# Patient Record
Sex: MALE | Race: WHITE | NOT HISPANIC OR LATINO
[De-identification: names, ages, dates, MRNs, and addresses within clinical notes are randomized per-mention and may not be internally consistent; named-entity substitution may affect disease eponyms.]

---

## 2021-09-22 PROBLEM — Z00.00 ENCOUNTER FOR PREVENTIVE HEALTH EXAMINATION: Status: ACTIVE | Noted: 2021-09-22

## 2021-09-23 ENCOUNTER — APPOINTMENT (OUTPATIENT)
Dept: PULMONOLOGY | Facility: CLINIC | Age: 86
End: 2021-09-23
Payer: MEDICARE

## 2021-09-23 VITALS
BODY MASS INDEX: 31.4 KG/M2 | HEIGHT: 69 IN | TEMPERATURE: 95.1 F | OXYGEN SATURATION: 97 % | DIASTOLIC BLOOD PRESSURE: 70 MMHG | HEART RATE: 89 BPM | SYSTOLIC BLOOD PRESSURE: 142 MMHG | WEIGHT: 212 LBS

## 2021-09-23 DIAGNOSIS — I48.91 UNSPECIFIED ATRIAL FIBRILLATION: ICD-10-CM

## 2021-09-23 DIAGNOSIS — Z82.49 FAMILY HISTORY OF ISCHEMIC HEART DISEASE AND OTHER DISEASES OF THE CIRCULATORY SYSTEM: ICD-10-CM

## 2021-09-23 DIAGNOSIS — Z80.9 FAMILY HISTORY OF MALIGNANT NEOPLASM, UNSPECIFIED: ICD-10-CM

## 2021-09-23 DIAGNOSIS — Z78.9 OTHER SPECIFIED HEALTH STATUS: ICD-10-CM

## 2021-09-23 DIAGNOSIS — Z87.891 PERSONAL HISTORY OF NICOTINE DEPENDENCE: ICD-10-CM

## 2021-09-23 DIAGNOSIS — N18.4 CHRONIC KIDNEY DISEASE, STAGE 4 (SEVERE): ICD-10-CM

## 2021-09-23 DIAGNOSIS — I10 ESSENTIAL (PRIMARY) HYPERTENSION: ICD-10-CM

## 2021-09-23 LAB — EPWORTH SCORE: 9

## 2021-09-23 PROCEDURE — 90662 IIV NO PRSV INCREASED AG IM: CPT

## 2021-09-23 PROCEDURE — 99205 OFFICE O/P NEW HI 60 MIN: CPT | Mod: 25

## 2021-09-23 PROCEDURE — G0008: CPT

## 2021-09-23 RX ORDER — SACUBITRIL AND VALSARTAN 24; 26 MG/1; MG/1
24-26 TABLET, FILM COATED ORAL
Refills: 0 | Status: ACTIVE | COMMUNITY

## 2021-09-23 RX ORDER — CALCITRIOL 0.25 UG/1
0.25 CAPSULE, LIQUID FILLED ORAL
Qty: 60 | Refills: 0 | Status: ACTIVE | COMMUNITY
Start: 2021-08-18

## 2021-09-23 RX ORDER — APIXABAN 2.5 MG/1
2.5 TABLET, FILM COATED ORAL
Qty: 60 | Refills: 0 | Status: ACTIVE | COMMUNITY
Start: 2021-08-11

## 2021-09-23 RX ORDER — AMLODIPINE BESYLATE 2.5 MG/1
2.5 TABLET ORAL
Qty: 30 | Refills: 0 | Status: ACTIVE | COMMUNITY
Start: 2021-08-31

## 2021-09-23 RX ORDER — PREDNISONE 20 MG/1
20 TABLET ORAL
Qty: 8 | Refills: 0 | Status: ACTIVE | COMMUNITY
Start: 2021-09-07

## 2021-09-23 RX ORDER — FUROSEMIDE 40 MG/1
40 TABLET ORAL
Qty: 60 | Refills: 0 | Status: ACTIVE | COMMUNITY
Start: 2021-09-07

## 2021-09-23 NOTE — PHYSICAL EXAM
[No Acute Distress] : no acute distress [Normal Appearance] : normal appearance [No Neck Mass] : no neck mass [Normal Rate/Rhythm] : normal rate/rhythm [Normal S1, S2] : normal s1, s2 [No Murmurs] : no murmurs [No Resp Distress] : no resp distress [Benign] : benign [Normal Gait] : normal gait [No Clubbing] : no clubbing [No Cyanosis] : no cyanosis [FROM] : FROM [Normal Color/ Pigmentation] : normal color/ pigmentation [No Focal Deficits] : no focal deficits [Oriented x3] : oriented x3 [Normal Affect] : normal affect [Enlarged Base of the Tongue] : enlarged base of the tongue [Erythema] : erythema [Nasal congestion] : nasal congestion [Murmur ___ / 6] : murmur [unfilled] / 6 [No Abnormalities] : no abnormalities [Kyphosis] : kyphosis [Scoliosis] : scoliosis [IV] : Mallampati Class: IV [TextBox_11] : severe deviated nasal septum, crowded o/p  [TextBox_68] : globally diminished BS, decreased BS at the bases, scant basilar rales, mild expiratory wheeze  [TextBox_89] : obese [TextBox_105] : 1+ brawny edema w/ CVS changes

## 2021-09-23 NOTE — CONSULT LETTER
[FreeTextEntry1] : Thank you for allowing me to consult on AUGUSTIN MAGALLANES  for  COPD/ TER[facility].  Please see my note below.\par \par   [FreeTextEntry3] : Thank you very much for allowing me to consult on your patient.  If you have any questions, please do not hesitate to contact me.\par \par Sincerely,\par \par Elliott Yang MD\par Pulmonary and Sleep Medicine\par [facility] [___] : [unfilled]

## 2021-09-23 NOTE — ASSESSMENT
[FreeTextEntry1] : Above was discussed at length with the patient and his wife who have an excellent understanding of the issues.

## 2021-09-23 NOTE — REVIEW OF SYSTEMS
[Fatigue] : fatigue [EDS] : eds [Nasal Congestion] : nasal congestion [Dyspnea] : dyspnea [A.M. Dry Mouth] : a.m. dry mouth [SOB on Exertion] : sob on exertion [Leg Cramps] : leg cramps [Seasonal Allergies] : seasonal allergies [Arthralgias] : arthralgias [Negative] : Endocrine

## 2021-09-23 NOTE — HISTORY OF PRESENT ILLNESS
[Former] : former [Never] : never [TextBox_4] : I was asked to consult on this 89 y/o WM for emphysema. \par The patient is a former smoker (age 14- 39) former  w/ a PMHx sig for HTN, s/p pacemaker who presents w/ his wife for SOB. He states he can only walk 1/2 block before he gets SOB. He coughs up yellow phlegm once in a while more often in the morning. He suffers from severe allergy symptoms and nasal congestion when pollen is high but this only started about 20 years ago. He had ankle swelling from amlodipine but he is on furosemide to counteract that. He wakes up at least 2x/ night, sometimes 3x. He often uses the bathroom when he wakes up. He sleeps with his mouth open at night and wakes up with a dry mouth. His wife says his snoring used to be worse, but it has subsided. He usually wakes up around 6 AM. He denies CP, pressure, tightness, cough while eating, or heartburn. He has fluctuated between 205- 220 lbs the past 5 years. He didn't notice any difference in his breathing when he was 205 compared to 220 but his wife says he had more energy when his weight was lower. 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  [TextBox_13] : 25 [YearQuit] : 1971

## 2021-09-23 NOTE — DISCUSSION/SUMMARY
[FreeTextEntry1] : [facility] 9/15/2021: WBC 8.5, Hg 10, Hct 30.5, , eos 3%, Bun 77, Cr 3.42, bicarb 21, LFTs normal \par \par CXR 9/6/21 done at [facility]: cardiomegaly, permanent pacemaker, cardiovascular congestion, + hyperinflation

## 2021-09-24 ENCOUNTER — LABORATORY RESULT (OUTPATIENT)
Age: 86
End: 2021-09-24

## 2021-09-24 ENCOUNTER — RESULT REVIEW (OUTPATIENT)
Age: 86
End: 2021-09-24

## 2021-09-26 LAB
25(OH)D3 SERPL-MCNC: 51.2 NG/ML
BASOPHILS # BLD AUTO: 0.03 K/UL
BASOPHILS NFR BLD AUTO: 0.4 %
EOSINOPHIL # BLD AUTO: 0.19 K/UL
EOSINOPHIL NFR BLD AUTO: 2.3 %
ERYTHROCYTE [SEDIMENTATION RATE] IN BLOOD BY WESTERGREN METHOD: 37 MM/HR
HCT VFR BLD CALC: 33.4 %
HGB BLD-MCNC: 10.2 G/DL
IMM GRANULOCYTES NFR BLD AUTO: 0.4 %
LYMPHOCYTES # BLD AUTO: 0.92 K/UL
LYMPHOCYTES NFR BLD AUTO: 11 %
MAGNESIUM SERPL-MCNC: 2.2 MG/DL
MAN DIFF?: NORMAL
MCHC RBC-ENTMCNC: 30.5 GM/DL
MCHC RBC-ENTMCNC: 30.8 PG
MCV RBC AUTO: 100.9 FL
MONOCYTES # BLD AUTO: 1.08 K/UL
MONOCYTES NFR BLD AUTO: 12.9 %
NEUTROPHILS # BLD AUTO: 6.15 K/UL
NEUTROPHILS NFR BLD AUTO: 73 %
NT-PROBNP SERPL-MCNC: 1753 PG/ML
PLATELET # BLD AUTO: 314 K/UL
RBC # BLD: 3.31 M/UL
RBC # FLD: 12.6 %
WBC # FLD AUTO: 8.4 K/UL

## 2021-09-28 ENCOUNTER — RESULT REVIEW (OUTPATIENT)
Age: 86
End: 2021-09-28

## 2021-10-04 LAB
A ALTERNATA IGE QN: <0.1 KUA/L
A FUMIGATUS IGE QN: <0.1 KUA/L
BERMUDA GRASS IGE QN: <0.1 KUA/L
BOXELDER IGE QN: <0.1 KUA/L
C HERBARUM IGE QN: <0.1 KUA/L
CALIF WALNUT IGE QN: <0.1 KUA/L
CAT DANDER IGE QN: 0.22 KUA/L
CMN PIGWEED IGE QN: <0.1 KUA/L
COMMON RAGWEED IGE QN: <0.1 KUA/L
COTTONWOOD IGE QN: <0.1 KUA/L
D FARINAE IGE QN: 1.85 KUA/L
D PTERONYSS IGE QN: 1.02 KUA/L
DEPRECATED A ALTERNATA IGE RAST QL: 0
DEPRECATED A FUMIGATUS IGE RAST QL: 0
DEPRECATED BERMUDA GRASS IGE RAST QL: 0
DEPRECATED BOXELDER IGE RAST QL: 0
DEPRECATED C HERBARUM IGE RAST QL: 0
DEPRECATED CAT DANDER IGE RAST QL: NORMAL
DEPRECATED COMMON PIGWEED IGE RAST QL: 0
DEPRECATED COMMON RAGWEED IGE RAST QL: 0
DEPRECATED COTTONWOOD IGE RAST QL: 0
DEPRECATED D FARINAE IGE RAST QL: 2
DEPRECATED D PTERONYSS IGE RAST QL: 2
DEPRECATED DOG DANDER IGE RAST QL: 0
DEPRECATED GOOSEFOOT IGE RAST QL: 0
DEPRECATED LONDON PLANE IGE RAST QL: 0
DEPRECATED MOUSE URINE PROT IGE RAST QL: 0
DEPRECATED MUGWORT IGE RAST QL: 0
DEPRECATED P NOTATUM IGE RAST QL: 0
DEPRECATED RED CEDAR IGE RAST QL: 0
DEPRECATED ROACH IGE RAST QL: 0
DEPRECATED SHEEP SORREL IGE RAST QL: 0
DEPRECATED SILVER BIRCH IGE RAST QL: 0
DEPRECATED TIMOTHY IGE RAST QL: 0
DEPRECATED WHITE ASH IGE RAST QL: 0
DEPRECATED WHITE OAK IGE RAST QL: 0
DOG DANDER IGE QN: <0.1 KUA/L
GOOSEFOOT IGE QN: <0.1 KUA/L
LONDON PLANE IGE QN: <0.1 KUA/L
MOUSE URINE PROT IGE QN: <0.1 KUA/L
MUGWORT IGE QN: <0.1 KUA/L
MULBERRY (T70) CLASS: 0
MULBERRY (T70) CONC: <0.1 KUA/L
P NOTATUM IGE QN: <0.1 KUA/L
RED CEDAR IGE QN: <0.1 KUA/L
ROACH IGE QN: <0.1 KUA/L
SHEEP SORREL IGE QN: <0.1 KUA/L
SILVER BIRCH IGE QN: <0.1 KUA/L
TIMOTHY IGE QN: <0.1 KUA/L
TOTAL IGE SMQN RAST: 200 KU/L
TREE ALLERG MIX1 IGE QL: 0
WHITE ASH IGE QN: <0.1 KUA/L
WHITE ELM IGE QN: 0
WHITE ELM IGE QN: <0.1 KUA/L
WHITE OAK IGE QN: <0.1 KUA/L

## 2021-10-12 RX ORDER — TIOTROPIUM BROMIDE INHALATION SPRAY 3.12 UG/1
2.5 SPRAY, METERED RESPIRATORY (INHALATION)
Qty: 1 | Refills: 5 | Status: ACTIVE | COMMUNITY
Start: 2021-09-23 | End: 1900-01-01

## 2021-10-14 ENCOUNTER — APPOINTMENT (OUTPATIENT)
Dept: PULMONOLOGY | Facility: CLINIC | Age: 86
End: 2021-10-14
Payer: MEDICARE

## 2021-10-14 ENCOUNTER — NON-APPOINTMENT (OUTPATIENT)
Age: 86
End: 2021-10-14

## 2021-10-14 VITALS
OXYGEN SATURATION: 99 % | HEIGHT: 69 IN | HEART RATE: 91 BPM | TEMPERATURE: 94.7 F | SYSTOLIC BLOOD PRESSURE: 126 MMHG | BODY MASS INDEX: 31.84 KG/M2 | DIASTOLIC BLOOD PRESSURE: 64 MMHG | WEIGHT: 215 LBS

## 2021-10-14 DIAGNOSIS — R06.83 SNORING: ICD-10-CM

## 2021-10-14 PROCEDURE — 99215 OFFICE O/P EST HI 40 MIN: CPT

## 2021-10-14 NOTE — DISCUSSION/SUMMARY
[FreeTextEntry1] : All images and report reviewed by me in the office \par CXR 9/21/2021: Showed hyperinflation, cardiomegaly, mild eventration of the L hemidiaphragm. \par CXR 9/6/21 done at [facility]: cardiomegaly, permanent pacemaker, cardiovascular congestion, + hyperinflation\par \par PFTs 10/01/2021 Actual FEV1 1.38 (FEV1 Pred 51%), FEV1/FVC(%) - 66, %, TLC 79%, RV/%, DLCO 44% \par 6MWT 10/1/2021: No sig desat post 6 min on RA.\par \par [facility] 9/15/2021: WBC 8.5, Hg 10, Hct 30.5, , eos 3%, Bun 77, Cr 3.42, bicarb 21, LFTs normal \par

## 2021-10-14 NOTE — REVIEW OF SYSTEMS
[Postnasal Drip] : postnasal drip [Dry Mouth] : dry mouth [Sinus Problems] : sinus problems [Cough] : cough [Sputum] : sputum [A.M. Dry Mouth] : a.m. dry mouth [SOB on Exertion] : sob on exertion [Edema] : edema [Leg Cramps] : leg cramps [Seasonal Allergies] : seasonal allergies [Negative] : Endocrine [TextBox_30] : yellow

## 2021-10-14 NOTE — HISTORY OF PRESENT ILLNESS
[Former] : former [Never] : never [TextBox_4] : Patient returns w/ his wife on a f/u for emphysema. He has been using Stiolto QAM, but doesn't really see a difference. His wife notes he was doing "great" the first couple of days on Stiolto but he has declined over the last week or so. He has been doing physical therapy and is trying to breathe through his nose more. He only coughs occasionally and denies CP or pressure. He rests for 3 hours every night before he actually goes to sleep. His wife says he's been feeling very cold lately. [TextBox_13] : 25 [YearQuit] : 1971

## 2021-10-14 NOTE — PHYSICAL EXAM
[No Acute Distress] : no acute distress [Enlarged Base of the Tongue] : enlarged base of the tongue [Nasal congestion] : nasal congestion [IV] : Mallampati Class: IV [Normal Appearance] : normal appearance [No Neck Mass] : no neck mass [Normal Rate/Rhythm] : normal rate/rhythm [Murmur ___ / 6] : murmur [unfilled] / 6 [Normal S1, S2] : normal s1, s2 [No Murmurs] : no murmurs [No Resp Distress] : no resp distress [No Abnormalities] : no abnormalities [Kyphosis] : kyphosis [Scoliosis] : scoliosis [Benign] : benign [Normal Gait] : normal gait [No Clubbing] : no clubbing [No Cyanosis] : no cyanosis [FROM] : FROM [Normal Color/ Pigmentation] : normal color/ pigmentation [No Focal Deficits] : no focal deficits [Oriented x3] : oriented x3 [Normal Affect] : normal affect [TextBox_11] : severe deviated nasal septum [TextBox_68] : full lung, bibasilar rales [TextBox_89] : obese [TextBox_105] : 1+ edema

## 2021-10-26 ENCOUNTER — RESULT REVIEW (OUTPATIENT)
Age: 86
End: 2021-10-26

## 2021-10-26 ENCOUNTER — APPOINTMENT (OUTPATIENT)
Dept: PULMONOLOGY | Facility: CLINIC | Age: 86
End: 2021-10-26
Payer: MEDICARE

## 2021-10-26 VITALS
SYSTOLIC BLOOD PRESSURE: 102 MMHG | HEIGHT: 69 IN | BODY MASS INDEX: 31.84 KG/M2 | TEMPERATURE: 98.6 F | OXYGEN SATURATION: 97 % | DIASTOLIC BLOOD PRESSURE: 60 MMHG | HEART RATE: 75 BPM | WEIGHT: 215 LBS

## 2021-10-26 PROCEDURE — 99215 OFFICE O/P EST HI 40 MIN: CPT

## 2021-10-27 LAB
ANION GAP SERPL CALC-SCNC: 17 MMOL/L
BASOPHILS # BLD AUTO: 0.04 K/UL
BASOPHILS NFR BLD AUTO: 0.5 %
BUN SERPL-MCNC: 74 MG/DL
CALCIUM SERPL-MCNC: 9.9 MG/DL
CCP AB SER IA-ACNC: <8 UNITS
CHLORIDE SERPL-SCNC: 105 MMOL/L
CO2 SERPL-SCNC: 20 MMOL/L
CREAT SERPL-MCNC: 3.95 MG/DL
CRP SERPL-MCNC: 3 MG/L
EOSINOPHIL # BLD AUTO: 0.23 K/UL
EOSINOPHIL NFR BLD AUTO: 3.2 %
ERYTHROCYTE [SEDIMENTATION RATE] IN BLOOD BY WESTERGREN METHOD: 70 MM/HR
GLUCOSE SERPL-MCNC: 100 MG/DL
HCT VFR BLD CALC: 31.4 %
HGB BLD-MCNC: 9.8 G/DL
IMM GRANULOCYTES NFR BLD AUTO: 0.3 %
LYMPHOCYTES # BLD AUTO: 0.71 K/UL
LYMPHOCYTES NFR BLD AUTO: 9.8 %
MAGNESIUM SERPL-MCNC: 2.1 MG/DL
MAN DIFF?: NORMAL
MCHC RBC-ENTMCNC: 31.1 PG
MCHC RBC-ENTMCNC: 31.2 GM/DL
MCV RBC AUTO: 99.7 FL
MONOCYTES # BLD AUTO: 1.04 K/UL
MONOCYTES NFR BLD AUTO: 14.3 %
NEUTROPHILS # BLD AUTO: 5.24 K/UL
NEUTROPHILS NFR BLD AUTO: 71.9 %
NT-PROBNP SERPL-MCNC: 2047 PG/ML
PLATELET # BLD AUTO: 296 K/UL
POTASSIUM SERPL-SCNC: 4.1 MMOL/L
RBC # BLD: 3.15 M/UL
RBC # FLD: 12.9 %
RF+CCP IGG SER-IMP: NEGATIVE
RHEUMATOID FACT SER QL: <10 IU/ML
SODIUM SERPL-SCNC: 143 MMOL/L
WBC # FLD AUTO: 7.28 K/UL

## 2021-10-27 NOTE — DISCUSSION/SUMMARY
[FreeTextEntry1] : All images and report reviewed by me in the office \par PSG, Date: 10/09/2021, AHI: 22, REM AHI: 41.6, lowest saturation 78%, PLM Index: 53.9\par \par PFTs 10/01/2021 Actual FEV1 1.38 (FEV1 Pred 51%), FEV1/FVC(%) - 66, %, TLC 79%, RV/%, DLCO 44% \par 6MWT 10/1/2021: No sig desat post 6 min on RA.\par \par CXR 9/21/2021: Showed hyperinflation, cardiomegaly, mild eventration of the L hemidiaphragm. \par CXR 9/6/21 done at [facility]: cardiomegaly, permanent pacemaker, cardiovascular congestion, + hyperinflation\par \par [facility] 9/15/2021: WBC 8.5, Hg 10, Hct 30.5, , eos 3%, Bun 77, Cr 3.42, bicarb 21, LFTs normal \par

## 2021-10-27 NOTE — PHYSICAL EXAM
[No Acute Distress] : no acute distress [Enlarged Base of the Tongue] : enlarged base of the tongue [Nasal congestion] : nasal congestion [IV] : Mallampati Class: IV [Normal Appearance] : normal appearance [No Neck Mass] : no neck mass [Normal Rate/Rhythm] : normal rate/rhythm [Murmur ___ / 6] : murmur [unfilled] / 6 [Normal S1, S2] : normal s1, s2 [No Resp Distress] : no resp distress [Kyphosis] : kyphosis [Scoliosis] : scoliosis [Benign] : benign [Normal Gait] : normal gait [No Clubbing] : no clubbing [No Cyanosis] : no cyanosis [FROM] : FROM [Normal Color/ Pigmentation] : normal color/ pigmentation [No Focal Deficits] : no focal deficits [Oriented x3] : oriented x3 [Normal Affect] : normal affect [TextBox_11] : severe deviated nasal septum, mild erythema [TextBox_54] : distant [TextBox_68] : + L basilar rales > R [TextBox_89] : obese [TextBox_105] : trace to 1+ edema

## 2021-10-27 NOTE — HISTORY OF PRESENT ILLNESS
[Former] : former [Never] : never [TextBox_4] : Patient returns w/ his wife on a f/u for emphysema. His wife has not liked the way he has been breathing the last 2 weeks, noting an increased work of breathing and profound tiredness. He hasn't noticed a difference between Trelegy and his previous Stiolto. He thinks Stiolto works to a slight degree, as he doesn't cough as much and w/ minimal phlegm. He is using nasal saline 4x/day and his epistaxis has stopped.  His cardiologist gave him a clean bill of health from a cardiologic standpoint, but heard some fluid in his lungs. He still has to call in for his CPAP titration. He has cut down on his drinking, although he hasn't noticed any weight loss. He is going to [place] until Thanksgiving, and then going again from January- April. [TextBox_13] : 25 [YearQuit] : 1971

## 2021-10-27 NOTE — ASSESSMENT
[FreeTextEntry1] : Above was discussed at length with the patient and his wife Vivian, who have an excellent understanding of the issues.

## 2021-10-30 ENCOUNTER — RESULT REVIEW (OUTPATIENT)
Age: 86
End: 2021-10-30

## 2022-02-07 RX ORDER — FLUTICASONE FUROATE, UMECLIDINIUM BROMIDE AND VILANTEROL TRIFENATATE 100; 62.5; 25 UG/1; UG/1; UG/1
100-62.5-25 POWDER RESPIRATORY (INHALATION)
Qty: 1 | Refills: 5 | Status: ACTIVE | COMMUNITY
Start: 2021-10-14 | End: 1900-01-01

## 2022-03-13 RX ORDER — FLUTICASONE FUROATE, UMECLIDINIUM BROMIDE AND VILANTEROL TRIFENATATE 200; 62.5; 25 UG/1; UG/1; UG/1
200-62.5-25 POWDER RESPIRATORY (INHALATION)
Qty: 1 | Refills: 5 | Status: ACTIVE | COMMUNITY
Start: 1900-01-01 | End: 1900-01-01

## 2022-03-18 ENCOUNTER — APPOINTMENT (OUTPATIENT)
Dept: PULMONOLOGY | Facility: CLINIC | Age: 87
End: 2022-03-18
Payer: MEDICARE

## 2022-03-18 VITALS
SYSTOLIC BLOOD PRESSURE: 118 MMHG | BODY MASS INDEX: 30.21 KG/M2 | TEMPERATURE: 97.9 F | WEIGHT: 204 LBS | OXYGEN SATURATION: 98 % | DIASTOLIC BLOOD PRESSURE: 62 MMHG | HEIGHT: 69 IN | HEART RATE: 89 BPM

## 2022-03-18 PROCEDURE — 99215 OFFICE O/P EST HI 40 MIN: CPT

## 2022-03-21 NOTE — REVIEW OF SYSTEMS
[Recent Wt Loss (___ Lbs)] : ~T recent [unfilled] lb weight loss [Nasal Congestion] : nasal congestion [Sinus Problems] : sinus problems [Cough] : cough [Sputum] : sputum [A.M. Dry Mouth] : a.m. dry mouth [SOB on Exertion] : sob on exertion [Negative] : Endocrine [TextBox_30] : light yellow

## 2022-03-21 NOTE — DISCUSSION/SUMMARY
[FreeTextEntry1] : All images and report reviewed by me in the office \par CXR 9/21/2021: Showed hyperinflation, cardiomegaly, mild eventration of the L hemidiaphragm. \par CXR 9/6/21 done at [facility]: cardiomegaly, permanent pacemaker, cardiovascular congestion, + hyperinflation\par \par PSG, Date: 10/09/2021, AHI: 22, REM AHI: 41.6, lowest saturation 78%, PLM Index: 53.9\par \par PFTs 10/01/2021 Actual FEV1 1.38 (FEV1 Pred 51%), FEV1/FVC(%) - 66, %, TLC 79%, RV/%, DLCO 44% \par 6MWT 10/1/2021: No sig desat post 6 min on RA.\par \par [facility] 9/15/2021: WBC 8.5, Hg 10, Hct 30.5, , eos 3%, Bun 77, Cr 3.42, bicarb 21, LFTs normal \par

## 2022-03-21 NOTE — HISTORY OF PRESENT ILLNESS
[Former] : former [Never] : never [TextBox_4] : Patient returns on a f/u for emphysema. Patient's wife Josephine also present via cell phone. Patient becomes SOB if he walks too fast or too far. Per Josephine, the patient had "tremendous difficulty breathing" while they were in [place] and had to use a wheelchair for some of the longer walks. Patient's Trelegy was increased to 200 and he has been breathing a lot better on that now. He c/o nasal congestion and has only been using Ayr 1x/day. + Mild ankle swelling. He has lost 11 lbs since 10/26 (215 -> 204). [TextBox_13] : 25 [YearQuit] : 1971

## 2022-03-21 NOTE — PHYSICAL EXAM
[No Acute Distress] : no acute distress [Enlarged Base of the Tongue] : enlarged base of the tongue [IV] : Mallampati Class: IV [Normal Appearance] : normal appearance [No Neck Mass] : no neck mass [Normal Rate/Rhythm] : normal rate/rhythm [Murmur ___ / 6] : murmur [unfilled] / 6 [Normal S1, S2] : normal s1, s2 [No Resp Distress] : no resp distress [Kyphosis] : kyphosis [Scoliosis] : scoliosis [Benign] : benign [No Clubbing] : no clubbing [No Cyanosis] : no cyanosis [FROM] : FROM [Normal Color/ Pigmentation] : normal color/ pigmentation [No Focal Deficits] : no focal deficits [Oriented x3] : oriented x3 [Normal Affect] : normal affect [TextBox_11] : severe nasal congestion, crowded o/p [TextBox_54] : distant [TextBox_68] : diminished BS, + rhonchi and rales at the bases [TextBox_89] : obese [TextBox_99] : UE weakness/ atrophy  [TextBox_105] : trace BL LE edema

## 2022-05-24 ENCOUNTER — APPOINTMENT (OUTPATIENT)
Dept: PULMONOLOGY | Facility: CLINIC | Age: 87
End: 2022-05-24
Payer: MEDICARE

## 2022-05-24 VITALS
OXYGEN SATURATION: 100 % | TEMPERATURE: 93.6 F | DIASTOLIC BLOOD PRESSURE: 72 MMHG | SYSTOLIC BLOOD PRESSURE: 144 MMHG | HEART RATE: 87 BPM | BODY MASS INDEX: 29.47 KG/M2 | WEIGHT: 199 LBS | HEIGHT: 69 IN

## 2022-05-24 DIAGNOSIS — E66.9 OBESITY, UNSPECIFIED: ICD-10-CM

## 2022-05-24 PROCEDURE — 99215 OFFICE O/P EST HI 40 MIN: CPT

## 2022-05-25 PROBLEM — E66.9 OBESITY, CLASS I, BMI 30.0-34.9 (SEE ACTUAL BMI): Status: ACTIVE | Noted: 2021-10-14

## 2022-05-25 NOTE — DISCUSSION/SUMMARY
[FreeTextEntry1] : All images and report reviewed by me in the office \par CXR 9/21/2021: Showed hyperinflation, cardiomegaly, mild eventration of the L hemidiaphragm. \par CXR 9/6/21 done at [facility]: cardiomegaly, permanent pacemaker, cardiovascular congestion, + hyperinflation\par \par PSG, Date: 10/09/2021, AHI: 22, REM AHI: 41.6, lowest saturation 78%, PLM Index: 53.9\par \par PFTs 10/01/2021 Actual FEV1 1.38 (FEV1 Pred 51%), FEV1/FVC(%) - 66, %, TLC 79%, RV/%, DLCO 44% \par 6MWT 10/1/2021: No sig desat post 6 min on RA.\par \par [facility] 9/15/2021: WBC 8.5, Hg 10, Hct 30.5, , eos 3%, Bun 77, Cr 3.42, bicarb 21, LFTs normal \par

## 2022-05-25 NOTE — REVIEW OF SYSTEMS
[Recent Wt Loss (___ Lbs)] : ~T recent [unfilled] lb weight loss [Cough] : cough [Sputum] : sputum [SOB on Exertion] : sob on exertion [Negative] : Endocrine [TextBox_30] : yellowish

## 2022-05-25 NOTE — HISTORY OF PRESENT ILLNESS
[Former] : former [Never] : never [TextBox_4] : Patient returns on a f/u for emphysema. He was unable to tolerate the APAP machine for 3 nights in a row and ended up sending it back. He had tried both a nasal mask and a full face and had trouble tolerating either of them. Nonetheless, he has been doing very well on the combination of Trelegy 200 and pulm rehab. He has found it easier to cough up his phlegm using the techniques he learned in pulmonary rehab and his phlegm is white/ yellow in color. He has lost 25 lbs so far through pulm rehab and is close to his goal of getting down to 195. He feels like he is also breathing better since he lost the weight. He is able to do 20 min on his treadmill and rides an indoor bike as well. He has a pollen allergy that causes him to have rhinorrhea and watery eyes. He regularly takes Alavert and adds Flonase in the morning on days when there is supposed to be a high pollen count.\par  [TextBox_13] : 25 [YearQuit] : 1971

## 2022-05-25 NOTE — ASSESSMENT
[FreeTextEntry1] : Above was discussed at length with the patient who has an excellent understanding of the issues.

## 2022-05-25 NOTE — PHYSICAL EXAM
[No Acute Distress] : no acute distress [Enlarged Base of the Tongue] : enlarged base of the tongue [IV] : Mallampati Class: IV [Normal Appearance] : normal appearance [No Neck Mass] : no neck mass [Normal Rate/Rhythm] : normal rate/rhythm [Murmur ___ / 6] : murmur [unfilled] / 6 [Normal S1, S2] : normal s1, s2 [No Resp Distress] : no resp distress [Kyphosis] : kyphosis [Scoliosis] : scoliosis [Benign] : benign [No Clubbing] : no clubbing [FROM] : FROM [Normal Color/ Pigmentation] : normal color/ pigmentation [No Focal Deficits] : no focal deficits [Oriented x3] : oriented x3 [Normal Affect] : normal affect [Nasal congestion] : nasal congestion [TextBox_11] : crowded o/p [TextBox_54] : distant [TextBox_68] : diminished BS w/ rhonchi at the bases, improved air entry [TextBox_89] : obese [TextBox_99] : UE weakness/ atrophy  [TextBox_105] : 1+ BLE edema, mild cyanosis

## 2022-08-10 ENCOUNTER — APPOINTMENT (OUTPATIENT)
Dept: PULMONOLOGY | Facility: CLINIC | Age: 87
End: 2022-08-10

## 2022-08-10 VITALS
BODY MASS INDEX: 27.85 KG/M2 | DIASTOLIC BLOOD PRESSURE: 62 MMHG | WEIGHT: 188 LBS | TEMPERATURE: 93.6 F | HEART RATE: 79 BPM | SYSTOLIC BLOOD PRESSURE: 124 MMHG | HEIGHT: 69 IN | OXYGEN SATURATION: 97 %

## 2022-08-10 DIAGNOSIS — M41.9 SCOLIOSIS, UNSPECIFIED: ICD-10-CM

## 2022-08-10 DIAGNOSIS — R06.5 DISTURBANCES OF SALIVARY SECRETION: ICD-10-CM

## 2022-08-10 DIAGNOSIS — J98.4 OTHER DISORDERS OF LUNG: ICD-10-CM

## 2022-08-10 DIAGNOSIS — E66.3 OVERWEIGHT: ICD-10-CM

## 2022-08-10 DIAGNOSIS — J30.89 OTHER ALLERGIC RHINITIS: ICD-10-CM

## 2022-08-10 DIAGNOSIS — J34.2 DEVIATED NASAL SEPTUM: ICD-10-CM

## 2022-08-10 DIAGNOSIS — K11.7 DISTURBANCES OF SALIVARY SECRETION: ICD-10-CM

## 2022-08-10 DIAGNOSIS — R93.89 ABNORMAL FINDINGS ON DIAGNOSTIC IMAGING OF OTHER SPECIFIED BODY STRUCTURES: ICD-10-CM

## 2022-08-10 DIAGNOSIS — J43.9 EMPHYSEMA, UNSPECIFIED: ICD-10-CM

## 2022-08-10 DIAGNOSIS — I50.9 HEART FAILURE, UNSPECIFIED: ICD-10-CM

## 2022-08-10 DIAGNOSIS — G47.33 OBSTRUCTIVE SLEEP APNEA (ADULT) (PEDIATRIC): ICD-10-CM

## 2022-08-10 PROCEDURE — 99214 OFFICE O/P EST MOD 30 MIN: CPT

## 2022-08-10 RX ORDER — COLCHICINE 0.6 MG/1
0.6 CAPSULE ORAL
Qty: 30 | Refills: 0 | Status: ACTIVE | COMMUNITY
Start: 2021-12-29

## 2022-08-10 RX ORDER — TRIAMCINOLONE ACETONIDE 1 MG/G
0.1 CREAM TOPICAL
Qty: 80 | Refills: 0 | Status: ACTIVE | COMMUNITY
Start: 2022-04-07

## 2022-08-10 NOTE — REVIEW OF SYSTEMS
[Recent Wt Loss (___ Lbs)] : ~T recent [unfilled] lb weight loss [SOB on Exertion] : sob on exertion [Negative] : Endocrine [Arthralgias] : arthralgias [Anemia] : anemia

## 2022-08-11 PROBLEM — E66.3 OVERWEIGHT (BMI 25.0-29.9): Status: ACTIVE | Noted: 2022-08-10

## 2022-08-11 PROBLEM — G47.33 OBSTRUCTIVE SLEEP APNEA: Status: ACTIVE | Noted: 2021-10-14

## 2022-08-11 PROBLEM — J98.4 RESTRICTIVE LUNG DISEASE: Status: ACTIVE | Noted: 2021-10-26

## 2022-08-11 PROBLEM — I50.9 CHF (CONGESTIVE HEART FAILURE): Status: ACTIVE | Noted: 2021-09-23

## 2022-08-11 PROBLEM — J43.9 EMPHYSEMA/COPD: Status: ACTIVE | Noted: 2021-09-23

## 2022-08-11 NOTE — PHYSICAL EXAM
[No Acute Distress] : no acute distress [Enlarged Base of the Tongue] : enlarged base of the tongue [Nasal congestion] : nasal congestion [IV] : Mallampati Class: IV [Normal Appearance] : normal appearance [No Neck Mass] : no neck mass [Normal Rate/Rhythm] : normal rate/rhythm [Normal S1, S2] : normal s1, s2 [No Resp Distress] : no resp distress [Benign] : benign [No Clubbing] : no clubbing [Normal Color/ Pigmentation] : normal color/ pigmentation [No Focal Deficits] : no focal deficits [Oriented x3] : oriented x3 [Normal Affect] : normal affect [Turbinate hypertrophy] : turbinate hypertrophy [No Cyanosis] : no cyanosis [No Edema] : no edema [TextBox_11] : crowded o/p, nasal discharge  [TextBox_54] : soft 2/6 systolic murmur [TextBox_68] : scant rales L base [TextBox_80] : mild kyphoscoliosis [TextBox_89] : obese [TextBox_99] : UE weakness/ atrophy  [TextBox_105] : R knee arthritis

## 2022-08-11 NOTE — DISCUSSION/SUMMARY
[FreeTextEntry1] : All images and report reviewed by me in the office \par CXR 9/21/2021: Showed hyperinflation, cardiomegaly, mild eventration of the L hemidiaphragm. \par CXR 9/6/21 done at [facility]: cardiomegaly, permanent pacemaker, cardiovascular congestion, + hyperinflation\par \par PSG, Date: 10/09/2021, AHI: 22, REM AHI: 41.6, lowest saturation 78%, PLM Index: 53.9\par \par PFTs 10/01/2021 Actual FEV1 1.38 (FEV1 Pred 51%), FEV1/FVC(%) - 66, %, TLC 79%, RV/%, DLCO 44% \par 6MWT 10/1/2021: No sig desat post 6 min on RA.\par \par [facility] 9/15/2021: WBC 8.5, Hg 10, Hct 30.5, , eos 3%, Bun 77, Cr 3.42, bicarb 21, LFTs normal \par

## 2022-08-11 NOTE — HISTORY OF PRESENT ILLNESS
[Former] : former [Never] : never [TextBox_4] : Patient returns on a f/u for emphysema and RLD. Patient states that he is doing great. He can walk 1/4 mile w/o stopping but notes that he is limited by both his knee pain and his breathing. However, he dropped another 11 lbs since 5/24 (199 -> 188) which has helped him greatly and he continues to exercise and lose weight. He still reports frequent dry mouth but denies cough. He has not had any gout flare ups lately.\par  [TextBox_13] : 25 [YearQuit] : 1971

## 2024-05-08 NOTE — REASON FOR VISIT
Patient is critically ill, requiring critical care services. [Follow-Up] : a follow-up visit [COPD] : COPD [Emphysema] : emphysema